# Patient Record
(demographics unavailable — no encounter records)

---

## 2024-10-14 NOTE — HISTORY OF PRESENT ILLNESS
[FreeTextEntry6] : breathing difficulty no acute distress - wheezing this has happened before for Alana following an acute illness